# Patient Record
Sex: FEMALE | Race: OTHER | Employment: STUDENT | URBAN - METROPOLITAN AREA
[De-identification: names, ages, dates, MRNs, and addresses within clinical notes are randomized per-mention and may not be internally consistent; named-entity substitution may affect disease eponyms.]

---
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
## 2025-03-13 NOTE — TELEPHONE ENCOUNTER
Please confirm refill appropriate?    No pap on file  LOV-12/16/24        Requested Prescriptions   Pending Prescriptions Disp Refills    Norethin Ace-Eth Estrad-FE 1-20 MG-MCG Oral Tab 84 tablet 3     Sig: Take 1 tablet by mouth daily.       Gynecology Medication Protocol Passed - 3/13/2025  1:39 PM        Passed - Medication is active on med list        Passed - Physical or Pelvic/Breast in past 12 or next 3 mos--VIA MANUAL LOOKUP